# Patient Record
Sex: FEMALE | Race: WHITE | NOT HISPANIC OR LATINO | Employment: UNEMPLOYED | URBAN - METROPOLITAN AREA
[De-identification: names, ages, dates, MRNs, and addresses within clinical notes are randomized per-mention and may not be internally consistent; named-entity substitution may affect disease eponyms.]

---

## 2023-06-12 DIAGNOSIS — E78.5 HYPERLIPIDEMIA, UNSPECIFIED HYPERLIPIDEMIA TYPE: ICD-10-CM

## 2023-06-12 DIAGNOSIS — Z00.00 ANNUAL PHYSICAL EXAM: ICD-10-CM

## 2023-06-12 DIAGNOSIS — E03.9 HYPOTHYROIDISM, UNSPECIFIED TYPE: ICD-10-CM

## 2023-06-14 ENCOUNTER — LAB (OUTPATIENT)
Dept: LAB | Facility: LAB | Age: 63
End: 2023-06-14
Payer: COMMERCIAL

## 2023-06-14 DIAGNOSIS — E78.5 HYPERLIPIDEMIA, UNSPECIFIED HYPERLIPIDEMIA TYPE: ICD-10-CM

## 2023-06-14 DIAGNOSIS — Z00.00 ANNUAL PHYSICAL EXAM: ICD-10-CM

## 2023-06-14 DIAGNOSIS — E03.9 HYPOTHYROIDISM, UNSPECIFIED TYPE: ICD-10-CM

## 2023-06-14 LAB
ALANINE AMINOTRANSFERASE (SGPT) (U/L) IN SER/PLAS: 13 U/L (ref 7–45)
ALBUMIN (G/DL) IN SER/PLAS: 4.5 G/DL (ref 3.4–5)
ALKALINE PHOSPHATASE (U/L) IN SER/PLAS: 99 U/L (ref 33–136)
ANION GAP IN SER/PLAS: 11 MMOL/L (ref 10–20)
ASPARTATE AMINOTRANSFERASE (SGOT) (U/L) IN SER/PLAS: 14 U/L (ref 9–39)
BASOPHILS (10*3/UL) IN BLOOD BY AUTOMATED COUNT: 0.06 X10E9/L (ref 0–0.1)
BASOPHILS/100 LEUKOCYTES IN BLOOD BY AUTOMATED COUNT: 0.9 % (ref 0–2)
BILIRUBIN TOTAL (MG/DL) IN SER/PLAS: 0.7 MG/DL (ref 0–1.2)
CALCIUM (MG/DL) IN SER/PLAS: 9.8 MG/DL (ref 8.6–10.6)
CARBON DIOXIDE, TOTAL (MMOL/L) IN SER/PLAS: 30 MMOL/L (ref 21–32)
CHLORIDE (MMOL/L) IN SER/PLAS: 102 MMOL/L (ref 98–107)
CHOLESTEROL (MG/DL) IN SER/PLAS: 308 MG/DL (ref 0–199)
CHOLESTEROL IN HDL (MG/DL) IN SER/PLAS: 65.8 MG/DL
CHOLESTEROL/HDL RATIO: 4.7
CREATININE (MG/DL) IN SER/PLAS: 0.53 MG/DL (ref 0.5–1.05)
EOSINOPHILS (10*3/UL) IN BLOOD BY AUTOMATED COUNT: 0.12 X10E9/L (ref 0–0.7)
EOSINOPHILS/100 LEUKOCYTES IN BLOOD BY AUTOMATED COUNT: 1.8 % (ref 0–6)
ERYTHROCYTE DISTRIBUTION WIDTH (RATIO) BY AUTOMATED COUNT: 13.2 % (ref 11.5–14.5)
ERYTHROCYTE MEAN CORPUSCULAR HEMOGLOBIN CONCENTRATION (G/DL) BY AUTOMATED: 31.6 G/DL (ref 32–36)
ERYTHROCYTE MEAN CORPUSCULAR VOLUME (FL) BY AUTOMATED COUNT: 95 FL (ref 80–100)
ERYTHROCYTES (10*6/UL) IN BLOOD BY AUTOMATED COUNT: 4.56 X10E12/L (ref 4–5.2)
GFR FEMALE: >90 ML/MIN/1.73M2
GLUCOSE (MG/DL) IN SER/PLAS: 91 MG/DL (ref 74–99)
HEMATOCRIT (%) IN BLOOD BY AUTOMATED COUNT: 43.1 % (ref 36–46)
HEMOGLOBIN (G/DL) IN BLOOD: 13.6 G/DL (ref 12–16)
IMMATURE GRANULOCYTES/100 LEUKOCYTES IN BLOOD BY AUTOMATED COUNT: 0.1 % (ref 0–0.9)
LDL: 221 MG/DL (ref 0–99)
LEUKOCYTES (10*3/UL) IN BLOOD BY AUTOMATED COUNT: 6.7 X10E9/L (ref 4.4–11.3)
LYMPHOCYTES (10*3/UL) IN BLOOD BY AUTOMATED COUNT: 1.87 X10E9/L (ref 1.2–4.8)
LYMPHOCYTES/100 LEUKOCYTES IN BLOOD BY AUTOMATED COUNT: 27.7 % (ref 13–44)
MONOCYTES (10*3/UL) IN BLOOD BY AUTOMATED COUNT: 0.52 X10E9/L (ref 0.1–1)
MONOCYTES/100 LEUKOCYTES IN BLOOD BY AUTOMATED COUNT: 7.7 % (ref 2–10)
NEUTROPHILS (10*3/UL) IN BLOOD BY AUTOMATED COUNT: 4.16 X10E9/L (ref 1.2–7.7)
NEUTROPHILS/100 LEUKOCYTES IN BLOOD BY AUTOMATED COUNT: 61.8 % (ref 40–80)
NRBC (PER 100 WBCS) BY AUTOMATED COUNT: 0 /100 WBC (ref 0–0)
PLATELETS (10*3/UL) IN BLOOD AUTOMATED COUNT: 312 X10E9/L (ref 150–450)
POTASSIUM (MMOL/L) IN SER/PLAS: 4.4 MMOL/L (ref 3.5–5.3)
PROTEIN TOTAL: 7.1 G/DL (ref 6.4–8.2)
SODIUM (MMOL/L) IN SER/PLAS: 139 MMOL/L (ref 136–145)
THYROTROPIN (MIU/L) IN SER/PLAS BY DETECTION LIMIT <= 0.05 MIU/L: 2.05 MIU/L (ref 0.44–3.98)
THYROXINE (T4) FREE (NG/DL) IN SER/PLAS: 0.88 NG/DL (ref 0.78–1.48)
TRIGLYCERIDE (MG/DL) IN SER/PLAS: 107 MG/DL (ref 0–149)
UREA NITROGEN (MG/DL) IN SER/PLAS: 10 MG/DL (ref 6–23)
VLDL: 21 MG/DL (ref 0–40)

## 2023-06-14 PROCEDURE — 84443 ASSAY THYROID STIM HORMONE: CPT

## 2023-06-14 PROCEDURE — 80061 LIPID PANEL: CPT

## 2023-06-14 PROCEDURE — 85025 COMPLETE CBC W/AUTO DIFF WBC: CPT

## 2023-06-14 PROCEDURE — 36415 COLL VENOUS BLD VENIPUNCTURE: CPT

## 2023-06-14 PROCEDURE — 84439 ASSAY OF FREE THYROXINE: CPT

## 2023-06-14 PROCEDURE — 80053 COMPREHEN METABOLIC PANEL: CPT

## 2023-06-16 PROBLEM — R92.8 ABNORMAL MAMMOGRAM: Status: ACTIVE | Noted: 2023-06-16

## 2023-06-16 PROBLEM — E78.5 DYSLIPIDEMIA: Status: ACTIVE | Noted: 2023-06-16

## 2023-06-16 PROBLEM — Z85.3 HISTORY OF BREAST CANCER: Status: ACTIVE | Noted: 2020-10-07

## 2023-06-16 PROBLEM — M19.049 OSTEOARTHRITIS, HAND: Status: ACTIVE | Noted: 2023-06-16

## 2023-06-16 PROBLEM — Z86.19 HISTORY OF HERPES ZOSTER: Status: ACTIVE | Noted: 2023-06-16

## 2023-06-16 PROBLEM — E78.5 HYPERLIPIDEMIA: Status: ACTIVE | Noted: 2023-06-16

## 2023-06-16 PROBLEM — R07.9 CHEST PAIN: Status: ACTIVE | Noted: 2023-06-16

## 2023-06-16 PROBLEM — B02.9 HERPES ZOSTER: Status: ACTIVE | Noted: 2023-06-16

## 2023-06-16 PROBLEM — D21.9 FIBROIDS: Status: ACTIVE | Noted: 2023-06-16

## 2023-06-16 PROBLEM — N63.21 MASS OF UPPER OUTER QUADRANT OF LEFT BREAST: Status: ACTIVE | Noted: 2023-06-16

## 2023-06-16 PROBLEM — E03.9 HYPOTHYROIDISM: Status: ACTIVE | Noted: 2023-06-16

## 2023-06-16 PROBLEM — E78.00 HIGH CHOLESTEROL: Status: ACTIVE | Noted: 2023-06-16

## 2023-06-16 PROBLEM — E07.9 THYROID DISEASE: Status: ACTIVE | Noted: 2023-06-16

## 2023-06-16 PROBLEM — R51.9 HEADACHE: Status: ACTIVE | Noted: 2023-06-16

## 2023-06-16 PROBLEM — R12 HEARTBURN: Status: ACTIVE | Noted: 2023-06-16

## 2023-06-16 PROBLEM — M79.643 HAND PAIN: Status: ACTIVE | Noted: 2023-06-16

## 2023-06-16 PROBLEM — M19.90 ARTHRITIS: Status: ACTIVE | Noted: 2023-06-16

## 2023-06-16 PROBLEM — Z86.000 HISTORY OF DUCTAL CARCINOMA IN SITU (DCIS) OF BREAST: Status: ACTIVE | Noted: 2020-10-07

## 2023-06-16 PROBLEM — M79.605 PAIN OF LEFT LOWER EXTREMITY: Status: ACTIVE | Noted: 2023-06-16

## 2023-06-16 RX ORDER — SULFAMETHOXAZOLE AND TRIMETHOPRIM 800; 160 MG/1; MG/1
60 TABLET ORAL DAILY
COMMUNITY
End: 2023-06-19 | Stop reason: SDUPTHER

## 2023-06-16 RX ORDER — CLOBETASOL PROPIONATE 0.5 MG/G
CREAM TOPICAL 2 TIMES DAILY
COMMUNITY
Start: 2020-04-21 | End: 2023-06-19 | Stop reason: ALTCHOICE

## 2023-06-19 ENCOUNTER — OFFICE VISIT (OUTPATIENT)
Dept: PRIMARY CARE | Facility: CLINIC | Age: 63
End: 2023-06-19
Payer: COMMERCIAL

## 2023-06-19 VITALS
SYSTOLIC BLOOD PRESSURE: 128 MMHG | DIASTOLIC BLOOD PRESSURE: 68 MMHG | BODY MASS INDEX: 23.57 KG/M2 | WEIGHT: 146 LBS | TEMPERATURE: 97.7 F

## 2023-06-19 DIAGNOSIS — E78.5 DYSLIPIDEMIA: ICD-10-CM

## 2023-06-19 DIAGNOSIS — E78.5 HYPERLIPIDEMIA, UNSPECIFIED HYPERLIPIDEMIA TYPE: ICD-10-CM

## 2023-06-19 DIAGNOSIS — Z12.4 CERVICAL CANCER SCREENING: ICD-10-CM

## 2023-06-19 DIAGNOSIS — E03.9 HYPOTHYROIDISM, UNSPECIFIED TYPE: ICD-10-CM

## 2023-06-19 DIAGNOSIS — Z00.00 HEALTHCARE MAINTENANCE: Primary | ICD-10-CM

## 2023-06-19 DIAGNOSIS — Z12.4 ENCOUNTER FOR PAPANICOLAOU SMEAR FOR CERVICAL CANCER SCREENING: ICD-10-CM

## 2023-06-19 PROCEDURE — 99396 PREV VISIT EST AGE 40-64: CPT | Performed by: FAMILY MEDICINE

## 2023-06-19 PROCEDURE — 1036F TOBACCO NON-USER: CPT | Performed by: FAMILY MEDICINE

## 2023-06-19 PROCEDURE — 88175 CYTOPATH C/V AUTO FLUID REDO: CPT

## 2023-06-19 RX ORDER — THYROID 60 MG/1
60 TABLET ORAL DAILY
Qty: 90 TABLET | Refills: 3 | Status: SHIPPED | OUTPATIENT
Start: 2023-06-19 | End: 2024-06-10 | Stop reason: SDUPTHER

## 2023-06-19 NOTE — PROGRESS NOTES
"Subjective   Patient ID: Alicia Pompa is a 63 y.o. female who presents for No chief complaint on file..  HPI  Feels well, no specific complaints or concerns today.    Colonoscopy 2018- told \"      good for 10 years\"    FH: 4 siblings all have HL, only brother had MI    2 years ago had left breast cancer- follows up with Dr. Munoz in August     CT coronary calcium score of 4 (2021)   Review of Systems  General: no fever or night sweats, no change in weight  Eyes: no vision disturbance  ENT: no hearing loss, no hoarseness, no mouth lesions, no sore throat, and no dysphagia  CV: no chest pain, no palpitations, no lower extremity edema  Resp: no shortness of breath, no cough  GI: no abdominal pain, no change in bowel habits  : no urinary problems  MSK: no arthralgias, myalgias, or back pain  Skin; no rashes or new/changed skin lesions- saw derm for screening   Neuro: no headache, no difficulty walking      Objective   Visit Vitals  /68   Temp 36.5 °C (97.7 °F) (Oral)      Physical Exam  Appears well.     HEENT: OP clear. Sclera white. PERRL. EACs and TMs clear.     Neck: supple, no masses, or lymphadenopathy. No thyromegaly.     CVS: RRR. No murmurs. No peripheral edema.    Lungs: clear with normal inspirations and expirations.    Breasts: Symmetrical, no masses, no skin retraction or dimpling. No nipple discharge. No axillary nodes.    Abd: soft, NT, no masses or HSM.    Pelvic: No vulvar lesions. No vaginal lesions or discharge. Normal appearing cervix with no lesions. No adnexal masses. Normal uterus.    Rectal: No masses. Guaiac negative stool.    Skin: No suspicious lesions.     Assessment/Plan   Diagnoses and all orders for this visit:  Healthcare maintenance  Hypothyroidism, unspecified type  -     Lipid panel; Future  -     T4, free; Future  -     TSH; Future  -     Comprehensive metabolic panel; Future  -     CBC and Auto Differential; Future  -     thyroid, pork, ([place] Thyroid) 60 mg tablet; Take 1 " "tablet (60 mg) by mouth once daily.  Dyslipidemia  -     Lipid panel; Future  -     T4, free; Future  -     TSH; Future  -     Comprehensive metabolic panel; Future  -     CBC and Auto Differential; Future  Hyperlipidemia, unspecified hyperlipidemia type  -     Lipid panel; Future  -     T4, free; Future  -     TSH; Future  -     Comprehensive metabolic panel; Future  -     CBC and Auto Differential; Future    Referral to vascular doctor for evaluation of varicose veins of legs     She chooses not to start chol-lowering medication- understands her CV risk with very elevated cholesterol and LDL \"but as long as my coronary calcium score is low I'm not going to worry about it\"     Frieda Solorio MD  Family Medicine   [facility]  "

## 2023-06-28 LAB
COMPLETE PATHOLOGY REPORT: NORMAL
CONVERTED CLINICAL DIAGNOSIS-HISTORY: NORMAL
CONVERTED DIAGNOSIS COMMENT: NORMAL
CONVERTED FINAL DIAGNOSIS: NORMAL
CONVERTED FINAL REPORT PDF LINK TO COPY AND PASTE: NORMAL
CONVERTED PHYSICIAN NOTIFICATION: NORMAL

## 2024-05-31 ENCOUNTER — LAB (OUTPATIENT)
Dept: LAB | Facility: LAB | Age: 64
End: 2024-05-31
Payer: COMMERCIAL

## 2024-05-31 DIAGNOSIS — E78.5 DYSLIPIDEMIA: ICD-10-CM

## 2024-05-31 DIAGNOSIS — E03.9 HYPOTHYROIDISM, UNSPECIFIED TYPE: ICD-10-CM

## 2024-05-31 DIAGNOSIS — E78.5 HYPERLIPIDEMIA, UNSPECIFIED HYPERLIPIDEMIA TYPE: ICD-10-CM

## 2024-05-31 LAB
ALBUMIN SERPL BCP-MCNC: 4.7 G/DL (ref 3.4–5)
ALP SERPL-CCNC: 93 U/L (ref 33–136)
ALT SERPL W P-5'-P-CCNC: 15 U/L (ref 7–45)
ANION GAP SERPL CALC-SCNC: 12 MMOL/L (ref 10–20)
AST SERPL W P-5'-P-CCNC: 14 U/L (ref 9–39)
BASOPHILS # BLD AUTO: 0.04 X10*3/UL (ref 0–0.1)
BASOPHILS NFR BLD AUTO: 0.5 %
BILIRUB SERPL-MCNC: 0.4 MG/DL (ref 0–1.2)
BUN SERPL-MCNC: 12 MG/DL (ref 6–23)
CALCIUM SERPL-MCNC: 9.9 MG/DL (ref 8.6–10.6)
CHLORIDE SERPL-SCNC: 102 MMOL/L (ref 98–107)
CHOLEST SERPL-MCNC: 319 MG/DL (ref 0–199)
CHOLESTEROL/HDL RATIO: 4.6
CO2 SERPL-SCNC: 28 MMOL/L (ref 21–32)
CREAT SERPL-MCNC: 0.55 MG/DL (ref 0.5–1.05)
EGFRCR SERPLBLD CKD-EPI 2021: >90 ML/MIN/1.73M*2
EOSINOPHIL # BLD AUTO: 0.12 X10*3/UL (ref 0–0.7)
EOSINOPHIL NFR BLD AUTO: 1.6 %
ERYTHROCYTE [DISTWIDTH] IN BLOOD BY AUTOMATED COUNT: 13.1 % (ref 11.5–14.5)
GLUCOSE SERPL-MCNC: 95 MG/DL (ref 74–99)
HCT VFR BLD AUTO: 43.3 % (ref 36–46)
HDLC SERPL-MCNC: 68.9 MG/DL
HGB BLD-MCNC: 14 G/DL (ref 12–16)
IMM GRANULOCYTES # BLD AUTO: 0.03 X10*3/UL (ref 0–0.7)
IMM GRANULOCYTES NFR BLD AUTO: 0.4 % (ref 0–0.9)
LDLC SERPL CALC-MCNC: 227 MG/DL
LYMPHOCYTES # BLD AUTO: 1.95 X10*3/UL (ref 1.2–4.8)
LYMPHOCYTES NFR BLD AUTO: 25.9 %
MCH RBC QN AUTO: 30.8 PG (ref 26–34)
MCHC RBC AUTO-ENTMCNC: 32.3 G/DL (ref 32–36)
MCV RBC AUTO: 95 FL (ref 80–100)
MONOCYTES # BLD AUTO: 0.54 X10*3/UL (ref 0.1–1)
MONOCYTES NFR BLD AUTO: 7.2 %
NEUTROPHILS # BLD AUTO: 4.84 X10*3/UL (ref 1.2–7.7)
NEUTROPHILS NFR BLD AUTO: 64.4 %
NON HDL CHOLESTEROL: 250 MG/DL (ref 0–149)
NRBC BLD-RTO: 0 /100 WBCS (ref 0–0)
PLATELET # BLD AUTO: 324 X10*3/UL (ref 150–450)
POTASSIUM SERPL-SCNC: 5 MMOL/L (ref 3.5–5.3)
PROT SERPL-MCNC: 7.3 G/DL (ref 6.4–8.2)
RBC # BLD AUTO: 4.55 X10*6/UL (ref 4–5.2)
SODIUM SERPL-SCNC: 137 MMOL/L (ref 136–145)
T4 FREE SERPL-MCNC: 1.01 NG/DL (ref 0.78–1.48)
TRIGL SERPL-MCNC: 118 MG/DL (ref 0–149)
TSH SERPL-ACNC: 2.71 MIU/L (ref 0.44–3.98)
VLDL: 24 MG/DL (ref 0–40)
WBC # BLD AUTO: 7.5 X10*3/UL (ref 4.4–11.3)

## 2024-05-31 PROCEDURE — 80061 LIPID PANEL: CPT

## 2024-05-31 PROCEDURE — 84439 ASSAY OF FREE THYROXINE: CPT

## 2024-05-31 PROCEDURE — 85025 COMPLETE CBC W/AUTO DIFF WBC: CPT

## 2024-05-31 PROCEDURE — 84443 ASSAY THYROID STIM HORMONE: CPT

## 2024-05-31 PROCEDURE — 80053 COMPREHEN METABOLIC PANEL: CPT

## 2024-06-10 ENCOUNTER — OFFICE VISIT (OUTPATIENT)
Dept: PRIMARY CARE | Facility: CLINIC | Age: 64
End: 2024-06-10
Payer: COMMERCIAL

## 2024-06-10 VITALS
DIASTOLIC BLOOD PRESSURE: 64 MMHG | HEIGHT: 65 IN | BODY MASS INDEX: 25.99 KG/M2 | TEMPERATURE: 98.7 F | HEART RATE: 87 BPM | WEIGHT: 156 LBS | SYSTOLIC BLOOD PRESSURE: 124 MMHG | OXYGEN SATURATION: 97 %

## 2024-06-10 DIAGNOSIS — Z12.4 ENCOUNTER FOR PAPANICOLAOU SMEAR FOR CERVICAL CANCER SCREENING: ICD-10-CM

## 2024-06-10 DIAGNOSIS — E03.9 HYPOTHYROIDISM, UNSPECIFIED TYPE: ICD-10-CM

## 2024-06-10 DIAGNOSIS — N95.2 VAGINITIS, ATROPHIC: ICD-10-CM

## 2024-06-10 DIAGNOSIS — Z12.31 BREAST CANCER SCREENING BY MAMMOGRAM: ICD-10-CM

## 2024-06-10 DIAGNOSIS — E78.2 MIXED HYPERLIPIDEMIA: ICD-10-CM

## 2024-06-10 DIAGNOSIS — Z12.4 CERVICAL CANCER SCREENING: ICD-10-CM

## 2024-06-10 DIAGNOSIS — Z00.00 HEALTH MAINTENANCE EXAMINATION: Primary | ICD-10-CM

## 2024-06-10 PROCEDURE — 88175 CYTOPATH C/V AUTO FLUID REDO: CPT

## 2024-06-10 PROCEDURE — 99396 PREV VISIT EST AGE 40-64: CPT | Performed by: FAMILY MEDICINE

## 2024-06-10 PROCEDURE — 1036F TOBACCO NON-USER: CPT | Performed by: FAMILY MEDICINE

## 2024-06-10 RX ORDER — ESTRADIOL 0.1 MG/G
CREAM VAGINAL
Qty: 42.5 G | Refills: 3 | Status: SHIPPED | OUTPATIENT
Start: 2024-06-10

## 2024-06-10 RX ORDER — THYROID 60 MG/1
60 TABLET ORAL DAILY
Qty: 90 TABLET | Refills: 3 | Status: SHIPPED | OUTPATIENT
Start: 2024-06-10

## 2024-06-10 NOTE — PROGRESS NOTES
"Subjective   Patient ID: Alicia Pompa is a 64 y.o. female who presents for Well Woman Exam (EP. Here for WWE/PAP.).  HPI  Feels well, no specific complaints or concerns today.    Has some vein issues of legs that she would like to see specialist about     Does mention inability to have sex due to pain and just a \"discomfort down there\" but has been hesitant to use vaginal estrogen due to h/o breast cancer    H/o very elevated cholesterol   Last coronary calcium score was in 2021 and was 4.5  Had taken red yeast rice for 2 mos (Jan and Feb)    Had breast cancer 3 years ago and was released from care of breast surgeon; mammogram due in August 2024    Colonoscopy 2018 and was told next one in 10 years   Review of Systems  General: no fever or night sweats, no change in weight  Eyes: no vision disturbance  ENT: no hearing loss, no hoarseness, no mouth lesions, no sore throat, and no dysphagia  CV: no chest pain, no palpitations, no lower extremity edema  Resp: no shortness of breath, no cough  GI: no abdominal pain, no change in bowel habits  : no urinary problems  MSK: no arthralgias, myalgias, or back pain  Skin; no rashes or new/changed skin lesions  Neuro: no headaches     Objective   /64   Pulse 87   Temp 37.1 °C (98.7 °F) (Oral)   Ht 1.651 m (5' 5\")   Wt 70.8 kg (156 lb)   SpO2 97%   BMI 25.96 kg/m²    Physical Exam  Appears well.     HEENT: OP clear. Sclera white. PERRL. EACs and TMs clear.     Neck: supple, no masses, or lymphadenopathy. No thyromegaly.     CVS: RRR. No murmurs. No peripheral edema.    Lungs: clear with normal inspirations and expirations.    Breasts: Symmetrical, no masses, no skin retraction or dimpling. No nipple discharge. No axillary nodes.    Abd: soft, NT, no masses or HSM.    Pelvic: No vulvar lesions. No vaginal lesions or discharge. Normal appearing cervix with no lesions. No adnexal masses. Normal uterus.    Rectal: No masses. Guaiac negative stool.    Skin: No " suspicious lesions.      Reviewed labs:      Assessment/Plan   Diagnoses and all orders for this visit:  Health maintenance examination  Breast cancer screening by mammogram  -     BI mammo bilateral screening tomosynthesis; Future  Encounter for Papanicolaou smear for cervical cancer screening  -     THINPREP PAP TEST  Cervical cancer screening  -     THINPREP PAP TEST  Hypothyroidism, unspecified type  -     thyroid, pork, ([place] Thyroid) 60 mg tablet; Take 1 tablet (60 mg) by mouth once daily.  Mixed hyperlipidemia  -     CT cardiac scoring wo IV contrast; Future  Vaginitis, atrophic  -     estradiol (Estrace) 0.01 % (0.1 mg/gram) vaginal cream; Apply to vaginal area/perineum nightly for 2 weeks then 3x/week    Pt defers statin medication, wants to get coronary calcium score test and then will consider resuming red yeast rice    Trial of estradiol vaginal cream to apply at vaginal opening and perineum area     Frieda Solorio MD  Family Medicine   [facility]

## 2024-06-20 LAB
CYTOLOGY CMNT CVX/VAG CYTO-IMP: NORMAL
LAB AP HPV GENOTYPE QUESTION: YES
LAB AP HPV HR: NORMAL
LABORATORY COMMENT REPORT: NORMAL
PATH REPORT.TOTAL CANCER: NORMAL

## 2024-08-20 ENCOUNTER — HOSPITAL ENCOUNTER (OUTPATIENT)
Dept: RADIOLOGY | Facility: CLINIC | Age: 64
Discharge: HOME | End: 2024-08-20
Payer: COMMERCIAL

## 2024-08-20 VITALS — WEIGHT: 153 LBS | HEIGHT: 65 IN | BODY MASS INDEX: 25.49 KG/M2

## 2024-08-20 DIAGNOSIS — Z12.31 BREAST CANCER SCREENING BY MAMMOGRAM: ICD-10-CM

## 2024-08-20 PROCEDURE — 77067 SCR MAMMO BI INCL CAD: CPT

## 2024-08-20 PROCEDURE — 77067 SCR MAMMO BI INCL CAD: CPT | Performed by: RADIOLOGY

## 2024-08-20 PROCEDURE — 77063 BREAST TOMOSYNTHESIS BI: CPT | Performed by: RADIOLOGY

## 2024-09-27 ENCOUNTER — HOSPITAL ENCOUNTER (OUTPATIENT)
Dept: RADIOLOGY | Facility: CLINIC | Age: 64
Discharge: HOME | End: 2024-09-27
Payer: COMMERCIAL

## 2024-09-27 DIAGNOSIS — E78.2 MIXED HYPERLIPIDEMIA: ICD-10-CM

## 2024-09-27 PROCEDURE — 75571 CT HRT W/O DYE W/CA TEST: CPT

## 2025-03-11 ENCOUNTER — TELEPHONE (OUTPATIENT)
Dept: PRIMARY CARE | Facility: CLINIC | Age: 65
End: 2025-03-11
Payer: COMMERCIAL

## 2025-03-11 DIAGNOSIS — E78.5 HYPERLIPIDEMIA, UNSPECIFIED HYPERLIPIDEMIA TYPE: Primary | ICD-10-CM

## 2025-03-11 DIAGNOSIS — K21.9 GASTROESOPHAGEAL REFLUX DISEASE WITHOUT ESOPHAGITIS: ICD-10-CM

## 2025-03-11 NOTE — TELEPHONE ENCOUNTER
Alicia called and said that she was in the hospital in Dec 24 and right after she got out, they left for [place]. She was given:    Atorvastatin 40 mg - 10 days left  Pantoprazole 40 mg - 10 days left    [facility]    She needs to have refills sent to her local [facility] so the pharmacy in [place] can fill them for her.     She is coming back in April and is scheduled for a med follow up.     Last appt: 06/10/24  Next appt: 04/22/25  Labs: 05/31/24

## 2025-03-12 RX ORDER — ATORVASTATIN CALCIUM 40 MG/1
40 TABLET, FILM COATED ORAL DAILY
Qty: 90 TABLET | Refills: 0 | Status: SHIPPED | OUTPATIENT
Start: 2025-03-12 | End: 2026-04-16

## 2025-03-12 RX ORDER — PANTOPRAZOLE SODIUM 40 MG/1
40 TABLET, DELAYED RELEASE ORAL DAILY
Qty: 90 TABLET | Refills: 0 | Status: SHIPPED | OUTPATIENT
Start: 2025-03-12 | End: 2025-05-11

## 2025-04-11 DIAGNOSIS — E78.5 HYPERLIPIDEMIA, UNSPECIFIED HYPERLIPIDEMIA TYPE: ICD-10-CM

## 2025-04-11 DIAGNOSIS — E03.9 HYPOTHYROIDISM, UNSPECIFIED TYPE: ICD-10-CM

## 2025-04-22 ENCOUNTER — APPOINTMENT (OUTPATIENT)
Dept: PRIMARY CARE | Facility: CLINIC | Age: 65
End: 2025-04-22
Payer: COMMERCIAL

## 2025-04-22 VITALS
SYSTOLIC BLOOD PRESSURE: 126 MMHG | TEMPERATURE: 98.2 F | DIASTOLIC BLOOD PRESSURE: 76 MMHG | OXYGEN SATURATION: 98 % | RESPIRATION RATE: 18 BRPM | HEART RATE: 83 BPM | WEIGHT: 162.3 LBS | BODY MASS INDEX: 27.04 KG/M2 | HEIGHT: 65 IN

## 2025-04-22 DIAGNOSIS — R60.0 PERIPHERAL EDEMA: Primary | ICD-10-CM

## 2025-04-22 DIAGNOSIS — E78.2 MIXED HYPERLIPIDEMIA: ICD-10-CM

## 2025-04-22 LAB
ALBUMIN SERPL-MCNC: 4.4 G/DL (ref 3.6–5.1)
ALP SERPL-CCNC: 121 U/L (ref 37–153)
ALT SERPL-CCNC: 20 U/L (ref 6–29)
ANION GAP SERPL CALCULATED.4IONS-SCNC: 11 MMOL/L (CALC) (ref 7–17)
AST SERPL-CCNC: 20 U/L (ref 10–35)
BILIRUB SERPL-MCNC: 0.8 MG/DL (ref 0.2–1.2)
BUN SERPL-MCNC: 9 MG/DL (ref 7–25)
CALCIUM SERPL-MCNC: 9.3 MG/DL (ref 8.6–10.4)
CHLORIDE SERPL-SCNC: 103 MMOL/L (ref 98–110)
CHOLEST SERPL-MCNC: 233 MG/DL
CHOLEST/HDLC SERPL: 3.3 (CALC)
CO2 SERPL-SCNC: 26 MMOL/L (ref 20–32)
CREAT SERPL-MCNC: 0.56 MG/DL (ref 0.5–1.05)
EGFRCR SERPLBLD CKD-EPI 2021: 103 ML/MIN/1.73M2
GLUCOSE SERPL-MCNC: 105 MG/DL (ref 65–99)
HDLC SERPL-MCNC: 70 MG/DL
LDLC SERPL CALC-MCNC: 141 MG/DL (CALC)
NONHDLC SERPL-MCNC: 163 MG/DL (CALC)
POTASSIUM SERPL-SCNC: 4.4 MMOL/L (ref 3.5–5.3)
PROT SERPL-MCNC: 6.7 G/DL (ref 6.1–8.1)
SODIUM SERPL-SCNC: 140 MMOL/L (ref 135–146)
T4 FREE SERPL-MCNC: 1 NG/DL (ref 0.8–1.8)
TRIGL SERPL-MCNC: 104 MG/DL
TSH SERPL-ACNC: 2.81 MIU/L (ref 0.4–4.5)

## 2025-04-22 PROCEDURE — 99213 OFFICE O/P EST LOW 20 MIN: CPT | Performed by: FAMILY MEDICINE

## 2025-04-22 PROCEDURE — 3008F BODY MASS INDEX DOCD: CPT | Performed by: FAMILY MEDICINE

## 2025-04-22 PROCEDURE — 1036F TOBACCO NON-USER: CPT | Performed by: FAMILY MEDICINE

## 2025-04-22 ASSESSMENT — PATIENT HEALTH QUESTIONNAIRE - PHQ9
SUM OF ALL RESPONSES TO PHQ9 QUESTIONS 1 AND 2: 0
2. FEELING DOWN, DEPRESSED OR HOPELESS: NOT AT ALL
1. LITTLE INTEREST OR PLEASURE IN DOING THINGS: NOT AT ALL

## 2025-04-22 ASSESSMENT — ENCOUNTER SYMPTOMS
LOSS OF SENSATION IN FEET: 0
DEPRESSION: 0
OCCASIONAL FEELINGS OF UNSTEADINESS: 0

## 2025-04-22 NOTE — PROGRESS NOTES
Subjective   Patient ID: Alicia Pompa is a 64 y.o. female who presents for Follow-up (EPV, F/U Medications, F/U Hospital stay 12/29 & 12/30/2024/Blood work completed 4/21/25/C/O feet puffy x 1 week).    History of Present Illness  Alicia Pompa is a 64 year old female who presents for med refill (atorvastatin)    She experienced severe chest pain radiating to the shoulder and arm, which led to a hospital visit after Jean. Cardiac enzymes were negative, and a CT scan of the chest was normal. Then she followed up with GI and had an EGD which revealed ulcers and a hiatal hernia, for which she was prescribed Protonix. She has since discontinued Protonix due to lack of heartburn symptoms and concerns about prolonged use.    She has a history of high cholesterol and initially took red yeast rice, which was ineffective. She agreed to start atorvastatin after her hospital visit in December. She also takes a baby aspirin daily.    She stopped intermittent fasting in January to accommodate morning medications, which she had been practicing for five years. Since stopping, her arthritis pain has returned, and she reports significant joint pain. She is considering resuming intermittent fasting as she is no longer taking Protonix.    She underwent multiple radiofrequency ablations for faulty leg valves starting in June, resulting in severe bruising and nerve damage. She reports increased leg pain, discoloration, and persistent nerve pain. She is hesitant to undergo further procedures. She has a history of varicose veins and notes that swelling was present in the summer before the procedures. Swelling in her feet has persisted for a week, and is worse in the right foot.      Review of Systems  Genl: The patient has been in good health without recent weight change, fevers, or night sweats  CVS: No chest pain, irregular heartbeat, shortness of breath, or swollen ankles  Resp: No cough or difficulty breathing  GI: No change in  "bowel habits or abdominal pain. No heartburn  : No urinary problems.   Objective     /76 (BP Location: Right arm, Patient Position: Sitting)   Pulse 83   Temp 36.8 °C (98.2 °F) (Oral)   Resp 18   Ht 1.651 m (5' 5\")   Wt 73.6 kg (162 lb 4.8 oz)   SpO2 98%   BMI 27.01 kg/m²      Assessment/Plan     Diagnoses and all orders for this visit:  Peripheral edema  -     Referral to Vascular Medicine; Future  Mixed hyperlipidemia    Assessment & Plan  Varicose veins with complications  Varicose veins with previous RFA causing nerve damage and persistent pain. Worsening discoloration and pain noted. She is hesitant about further procedures and seeks another opinion.  - Refer to a vascular specialist for a second opinion.    Swelling of lower extremities  Persistent swelling in feet, likely related to varicose veins, without systemic symptoms.  - Encourage exercise and use of compression stockings.    Hyperlipidemia  Hyperlipidemia managed with atorvastatin, resulting in significant cholesterol reduction. CoQ10 supplementation ongoing. Family history of cardiovascular issues noted.  - Continue atorvastatin therapy.  - Continue CoQ10 supplementation.  - Continue daily baby aspirin.    Frieda Solorio MD    This medical note was created with the assistance of artificial intelligence (AI) for documentation purposes. The content has been reviewed and confirmed by the healthcare provider for accuracy and completeness. Patient consented to the use of audio recording and use of AI during their visit.    "

## 2025-05-27 DIAGNOSIS — Z12.31 BREAST CANCER SCREENING BY MAMMOGRAM: ICD-10-CM

## 2025-06-04 DIAGNOSIS — E03.9 HYPOTHYROIDISM, UNSPECIFIED TYPE: ICD-10-CM

## 2025-06-04 NOTE — TELEPHONE ENCOUNTER
Alicia called for a refill: She will be out of only this medication before her upcoming appt on 06/13/25.     Alpha Thyroid 60 mg  only a couple left    [facility]    Last appt: 04/22/25  Next appt: 06/13/25  Labs: 04/21/25

## 2025-06-06 RX ORDER — THYROID 60 MG/1
60 TABLET ORAL DAILY
Qty: 30 TABLET | Refills: 0 | Status: SHIPPED | OUTPATIENT
Start: 2025-06-06

## 2025-06-13 ENCOUNTER — APPOINTMENT (OUTPATIENT)
Dept: PRIMARY CARE | Facility: CLINIC | Age: 65
End: 2025-06-13
Payer: COMMERCIAL

## 2025-06-13 VITALS
WEIGHT: 160.1 LBS | HEART RATE: 85 BPM | TEMPERATURE: 98.4 F | HEIGHT: 65 IN | SYSTOLIC BLOOD PRESSURE: 138 MMHG | DIASTOLIC BLOOD PRESSURE: 70 MMHG | RESPIRATION RATE: 18 BRPM | BODY MASS INDEX: 26.67 KG/M2 | OXYGEN SATURATION: 97 %

## 2025-06-13 DIAGNOSIS — E03.9 HYPOTHYROIDISM, UNSPECIFIED TYPE: ICD-10-CM

## 2025-06-13 DIAGNOSIS — E78.5 HYPERLIPIDEMIA, UNSPECIFIED HYPERLIPIDEMIA TYPE: ICD-10-CM

## 2025-06-13 DIAGNOSIS — N95.2 ATROPHIC VAGINITIS: ICD-10-CM

## 2025-06-13 DIAGNOSIS — Z00.00 HEALTH MAINTENANCE EXAMINATION: Primary | ICD-10-CM

## 2025-06-13 PROBLEM — E78.01 FAMILIAL HYPERCHOLESTEREMIA: Status: ACTIVE | Noted: 2024-12-29

## 2025-06-13 PROBLEM — C50.419 MALIGNANT NEOPLASM OF UPPER-OUTER QUADRANT OF FEMALE BREAST: Status: ACTIVE | Noted: 2023-08-17

## 2025-06-13 PROBLEM — L30.9 DERMATITIS: Status: ACTIVE | Noted: 2025-06-13

## 2025-06-13 PROBLEM — Z98.890 S/P ENDOSCOPY: Status: ACTIVE | Noted: 2024-12-30

## 2025-06-13 PROBLEM — R10.9 ACUTE ABDOMINAL PAIN: Status: ACTIVE | Noted: 2024-12-29

## 2025-06-13 PROBLEM — B02.9 SHINGLES: Status: ACTIVE | Noted: 2025-06-13

## 2025-06-13 PROBLEM — M79.643 PAIN OF HAND: Status: ACTIVE | Noted: 2025-06-13

## 2025-06-13 PROBLEM — K25.9 MULTIPLE GASTRIC ULCERS: Status: ACTIVE | Noted: 2024-12-30

## 2025-06-13 PROBLEM — K44.9 HIATAL HERNIA: Status: ACTIVE | Noted: 2024-12-30

## 2025-06-13 PROBLEM — E07.9 DISORDER OF THYROID GLAND: Status: ACTIVE | Noted: 2023-06-16

## 2025-06-13 PROBLEM — K29.80 DUODENITIS: Status: ACTIVE | Noted: 2024-12-30

## 2025-06-13 PROBLEM — R07.9 ACUTE CHEST PAIN: Status: ACTIVE | Noted: 2024-12-29

## 2025-06-13 PROBLEM — R74.01 HIGH ALANINE AMINOTRANSFERASE (ALT) LEVEL: Status: ACTIVE | Noted: 2025-06-13

## 2025-06-13 PROBLEM — J02.9 PHARYNGITIS: Status: ACTIVE | Noted: 2025-06-13

## 2025-06-13 PROBLEM — E78.00 HYPERCHOLESTEROLEMIA: Status: ACTIVE | Noted: 2023-06-16

## 2025-06-13 PROBLEM — M19.049 OSTEOARTHRITIS OF HAND: Status: ACTIVE | Noted: 2023-06-16

## 2025-06-13 PROBLEM — Z85.3 HISTORY OF MALIGNANT NEOPLASM OF BREAST: Status: ACTIVE | Noted: 2020-10-07

## 2025-06-13 PROBLEM — J32.9 SINUSITIS: Status: ACTIVE | Noted: 2025-06-13

## 2025-06-13 PROBLEM — L98.9 INFLAMMATORY DERMATOSIS: Status: ACTIVE | Noted: 2025-06-13

## 2025-06-13 PROBLEM — I24.9 ACUTE CORONARY SYNDROME (MULTI): Status: ACTIVE | Noted: 2024-12-29

## 2025-06-13 PROBLEM — K21.9 GERD (GASTROESOPHAGEAL REFLUX DISEASE): Status: ACTIVE | Noted: 2024-12-29

## 2025-06-13 PROBLEM — N63.21 LUMP IN UPPER OUTER QUADRANT OF LEFT BREAST: Status: ACTIVE | Noted: 2023-06-16

## 2025-06-13 PROBLEM — J01.90 ACUTE SINUSITIS: Status: ACTIVE | Noted: 2025-06-13

## 2025-06-13 PROCEDURE — 3008F BODY MASS INDEX DOCD: CPT | Performed by: FAMILY MEDICINE

## 2025-06-13 PROCEDURE — 99396 PREV VISIT EST AGE 40-64: CPT | Performed by: FAMILY MEDICINE

## 2025-06-13 RX ORDER — THYROID 60 MG/1
60 TABLET ORAL DAILY
Qty: 90 TABLET | Refills: 3 | Status: SHIPPED | OUTPATIENT
Start: 2025-06-13

## 2025-06-13 RX ORDER — UBIDECARENONE 30 MG
100 CAPSULE ORAL DAILY
COMMUNITY

## 2025-06-13 RX ORDER — ATORVASTATIN CALCIUM 40 MG/1
40 TABLET, FILM COATED ORAL DAILY
Qty: 90 TABLET | Refills: 3 | Status: SHIPPED | OUTPATIENT
Start: 2025-06-13 | End: 2026-07-18

## 2025-06-13 RX ORDER — ASPIRIN 81 MG/1
1 TABLET ORAL DAILY
COMMUNITY
Start: 2025-01-01

## 2025-06-13 RX ORDER — ESTRADIOL 0.1 MG/G
2 CREAM VAGINAL DAILY
Qty: 42.5 G | Refills: 3 | Status: SHIPPED | OUTPATIENT
Start: 2025-06-13 | End: 2026-06-13

## 2025-06-13 ASSESSMENT — PROMIS GLOBAL HEALTH SCALE
RATE_QUALITY_OF_LIFE: VERY GOOD
RATE_SOCIAL_SATISFACTION: VERY GOOD
RATE_AVERAGE_FATIGUE: MODERATE
CARRYOUT_SOCIAL_ACTIVITIES: VERY GOOD
CARRYOUT_PHYSICAL_ACTIVITIES: COMPLETELY
RATE_AVERAGE_PAIN: 4
EMOTIONAL_PROBLEMS: RARELY
RATE_MENTAL_HEALTH: VERY GOOD
RATE_PHYSICAL_HEALTH: GOOD
RATE_GENERAL_HEALTH: GOOD

## 2025-06-13 NOTE — PROGRESS NOTES
Subjective   Patient ID: Alicia Pompa is a 65 y.o. female who presents for Annual Exam (EPV, WWE with Pap/Blood work completed 4/21/25/C/O Left arm loss of full motion).    History of Present Illness  Alicia Pompa is a 65 year old female who presents for WWE    She has experienced a loss of full range of motion in her left arm for a few weeks. The pain is significant when attempting to move the arm, especially when lifting it over her head.     She has had two urinary tract infections (UTIs) in recent months. The first occurred in March in [place], described as 'peeing glass shards.' She used a telehealth service and was prescribed an antibiotic. In May, she experienced similar symptoms and received a prescription for Macrobid, which she took for five days. She is concerned about the recurrence of UTIs, especially with an upcoming two-week cruise. UTIs have been rare for her, with the last one occurring over 20 years ago. She has been using over-the-counter medication to manage symptoms temporarily.    She reports issues with vaginal atrophy, describing the perineal area as 'real thin' and prone to tearing, causing significant pain during urination. She has previously used estradiol tablets but has not been using them recently as they didn't seem to help all that much. The tissue is very sensitive and fragile, with occasional bleeding. She has tried various topical treatments for relief, including Vaseline and Desitin.  Sexual intercourse is painful.     She is currently taking atorvastatin since January 1st and [place] thyroid medication.     Review of Systems  General: no fever or night sweats, no change in weight  Eyes: no vision disturbance  ENT: no hearing loss, no hoarseness, no mouth lesions, no sore throat, and no dysphagia  CV: no chest pain, no palpitations, no lower extremity edema  Resp: no shortness of breath, no cough  GI: no abdominal pain, no change in bowel habits  : see HPI   MSK: just the  "shoulder pain (see HPI)  Skin; no rashes or new/changed skin lesions; sees dermatologist for skin screening   Neuro: no headache, no difficulty walking     Objective     /70 (BP Location: Left arm, Patient Position: Sitting)   Pulse 85   Temp 36.9 °C (98.4 °F) (Oral)   Resp 18   Ht 1.65 m (5' 4.96\")   Wt 72.6 kg (160 lb 1.6 oz)   SpO2 97%   BMI 26.67 kg/m²      Physical Exam  Appears well.     HEENT: OP clear. Sclera white. PERRL. EACs and TMs clear.     Neck: supple, no masses, or lymphadenopathy. No thyromegaly.     CVS: RRR. No murmurs. No peripheral edema.    Lungs: clear with normal inspirations and expirations.    Breasts: Symmetrical, no masses, no skin retraction or dimpling. No nipple discharge. No axillary nodes.    Abd: soft, NT, no masses or HSM.    Pelvic: No vulvar lesions; + vaginal atrophy     Skin: No suspicious lesions.      Assessment/Plan     Diagnoses and all orders for this visit:  Health maintenance examination  -     CBC and Auto Differential; Future  Hypothyroidism, unspecified type  -     thyroid, pork, ([place] Thyroid) 60 mg tablet; Take 1 tablet (60 mg) by mouth once daily.  -     CBC and Auto Differential; Future  Hyperlipidemia, unspecified hyperlipidemia type  -     atorvastatin (Lipitor) 40 mg tablet; Take 1 tablet (40 mg) by mouth once daily.  -     CBC and Auto Differential; Future  -     Comprehensive Metabolic Panel; Future  -     Lipid Panel; Future  Atrophic vaginitis  -     estradiol (Estrace) 0.01 % (0.1 mg/gram) vaginal cream; Insert 0.5 Applicatorfuls (2 g) into the vagina once daily. Apply to vagina nightly for 1 week then every Monday/Wednesday/Friday.  Cervical cancer screening    Assessment & Plan  Left Shoulder Pain  Loss of full range of motion likely due to rotator cuff issues. Risk of adhesive capsulitis due to limited movement. Cortisone injection may aid pain relief and mobility before physical therapy.  - Refer to orthopedic specialist for evaluation. " (Dr. Glez phone number given)    Vaginal Atrophy  Symptoms of vaginal atrophy with fragile tissue and dysuria. Inconsistent estradiol cream use. Increased UTI risk due to atrophy. Consistent estradiol cream use recommended to improve symptoms and reduce UTI risk.  - Recommend using estradiol cream both internally and externally.    Recurrent Urinary Tract Infections (UTIs)  Two UTIs recently with frequency and dysuria. Concerns about recurrence during travel.   - Advise to seek medical attention on the cruise if UTI symptoms recur.  - Offer nurse visit for urine test if symptoms arise before travel.    General Health Maintenance  Discussion of recent blood work and screenings. Last colonoscopy in 2018 with 10-year follow-up. Consider Cologuard as interim screening. Mammogram scheduled for August. Order lipid panel and chemistry panel.      Frieda Solorio MD    This medical note was created with the assistance of artificial intelligence (AI) for documentation purposes. The content has been reviewed and confirmed by the healthcare provider for accuracy and completeness. Patient consented to the use of audio recording and use of AI during their visit.

## 2025-08-05 ENCOUNTER — OFFICE VISIT (OUTPATIENT)
Dept: CARDIOLOGY | Facility: CLINIC | Age: 65
End: 2025-08-05
Payer: COMMERCIAL

## 2025-08-05 VITALS
WEIGHT: 167 LBS | BODY MASS INDEX: 28.51 KG/M2 | HEART RATE: 79 BPM | DIASTOLIC BLOOD PRESSURE: 60 MMHG | SYSTOLIC BLOOD PRESSURE: 120 MMHG | HEIGHT: 64 IN | OXYGEN SATURATION: 97 %

## 2025-08-05 DIAGNOSIS — I87.2 CHRONIC VENOUS INSUFFICIENCY: Primary | ICD-10-CM

## 2025-08-05 DIAGNOSIS — M79.89 LEG SWELLING: ICD-10-CM

## 2025-08-05 DIAGNOSIS — R25.2 NIGHT CRAMPS: ICD-10-CM

## 2025-08-05 PROCEDURE — 99212 OFFICE O/P EST SF 10 MIN: CPT

## 2025-08-05 PROCEDURE — 3008F BODY MASS INDEX DOCD: CPT | Performed by: INTERNAL MEDICINE

## 2025-08-05 PROCEDURE — 99202 OFFICE O/P NEW SF 15 MIN: CPT

## 2025-08-05 PROCEDURE — 99204 OFFICE O/P NEW MOD 45 MIN: CPT | Performed by: INTERNAL MEDICINE

## 2025-08-05 NOTE — PROGRESS NOTES
"Chief complaint: CVI     Subjective   Patient is accompanied by her , here to establish care  She has known longstanding history of CVI, last year she had bulging painful varicose veins, leg swelling and severe leg night cramps.  Was [facility] at [facility] and underwent RFA x 4 and sclerotherapy's bilateral.  Bulging painful varicose veins significantly improved, but she continued to have leg swelling and severe leg night cramps.  Was advised more intervention, decided to come today for a second opinion  No history of SVT or DVT  No bleeding varicosities, bruises noted on the legs but she does have bruises on the arms as well  No eczema or itching     She uses compression stockings only when traveling  She does volunteer at a park, walks a lot but does stand a lot as well  She elevates her legs whenever possible, but not above the level of the heart  Body mass index is 28.67 kg/m².    Review of Systems  As noted above   Bilateral intermittent leg swelling, worse at the end of the day  Night cramps, mainly at the left calf associated with contractures  Upper and lower extremity bruises, more on the legs  Low back pain   Arthritis   GERD   No other complaints today     Medical History[1]  Surgical History[2]  Social History[3]   Family History[4]   RX Allergies[5]    Objective   Physical Exam  /60 (BP Location: Left arm, Patient Position: Sitting)   Pulse 79   Ht 1.626 m (5' 4\")   Wt 75.8 kg (167 lb)   BMI 28.67 kg/m²      General: In no acute distress  Neuro: alert and oriented x3  CV:  RRR  Lungs: CTA bilaterally  Abd:  Soft, non-tender   Psych:  Appropriate affect  Upper extremities: No swelling, +2 radial   Lower extremities: david trace edema.  +2 DP  Skin:  mild bruises. No open ulcers.  David Spider/reticular veins.  Mild chronic venous changes, especially above the medial malleoli on the left     Medications   Current Outpatient Medications   Medication Instructions    aspirin 81 mg tablet 1 " "tablet, Daily    atorvastatin (LIPITOR) 40 mg, oral, Daily    co-enzyme Q-10 100 mg, Daily    estradiol (ESTRACE) 2 g, vaginal, Daily, Apply to vagina nightly for 1 week then every Monday/Wednesday/Friday.    thyroid (pork) (ARMOUR THYROID) 60 mg, oral, Daily       Lab Review   Recent Labs     04/21/25  0809 05/31/24  0903 06/14/23  0902 05/24/22  0912 04/19/21  0819 04/22/20  0926 03/21/19  1109 07/05/18  0811    137 139 137 141 138 138 141   K 4.4 5.0 4.4 4.3 4.1 4.8 4.6 4.6    102 102 102 105 102 101 104   CO2 26 28 30 28 30 29 28 29   ANIONGAP 11 12 11 11 10 12 14 13   BUN 9 12 10 10 9 10 13 13   CREATININE 0.56 0.55 0.53 0.58 0.54 0.59 0.61 0.57   EGFR 103 >90  --   --   --   --   --   --      Recent Labs     04/21/25  0809 05/31/24  0903 06/14/23  0902 05/24/22  0912 04/19/21  0819   ALBUMIN 4.4 4.7 4.5 4.6 4.2   ALKPHOS 121 93 99 96 80   ALT 20 15 13 14 11   AST 20 14 14 16 13   BILITOT 0.8 0.4 0.7 0.7 0.5     Recent Labs     05/31/24  0903 06/14/23  0902 05/24/22  0912 04/19/21  0819   WBC 7.5 6.7 7.0 6.5   HGB 14.0 13.6 13.8 13.9   HCT 43.3 43.1 42.6 43.3    312 304 273   MCV 95 95 96 98     No results for input(s): \"PTT\", \"INR\", \"HAUF\", \"DDIMERVTE\", \"HAPTOGLOBIN\", \"FIBRINOGEN\" in the last [id number] hours.  PTT - No results in last year.    Recent Labs     04/21/25  0809 05/31/24  0903 06/14/23  0902 05/24/22  0912 04/19/21  0819   CHOL 233* 319* 308* 312* 309*   LDLF  --   --  221* 230* 227*   HDL 70 68.9 65.8 66.4 62.3   TRIG 104 118 107 80 97     No results found for: \"HGBA1C\"  Lab Results   Component Value Date    TSH 2.81 04/21/2025     Hb, PLT and kidney function reviewed     Imaging    Assessment/Plan   Alicia Pompa is a 65 y.o. female with PMHx of HLD, elevated CAC, hypothyroidism, DCIS, ovarian cyst, pulmonary nodule, and HH     _ CVI s/p mamie-RFA x 4 and sclerotherapies 2024   _ Other possible contributing risk factors  Lack of compression.  Inadequate exercise and elevation. " weight    Plan   -- No clear indication of further venous procedures given that she has not tried conservative measures and no complications as of today  -- Use 20-30 mmHg compression stockings during the day   -- Leg elevation, exercise and conservative measures discussed   -- Monitor for possible complications as VV bleeding, dermatitis, ulcers and thrombophlebitis    -- She will let me know in 8 weeks if no improvement, earlier if there is any complications, will proceed with a VI study for possible need of intervention  -- The rest as detailed in the patient's instructions    Bibi Michael MD         [1]   Past Medical History:  Diagnosis Date    Allergic contact dermatitis due to plants, except food     Contact dermatitis due to poison ivy    Breast cancer     Ovarian cyst     Ovarian cyst    Personal history of other endocrine, nutritional and metabolic disease     History of hyperlipidemia    Personal history of other endocrine, nutritional and metabolic disease     History of hypothyroidism    Tachycardia, unspecified     Tachycardia   [2]   Past Surgical History:  Procedure Laterality Date    BREAST LUMPECTOMY Left     CARDIAC CATHETERIZATION  2015    OTHER SURGICAL HISTORY  01/11/2014    Pap smear for cervical cancer screening    VEIN SURGERY  2024 RFA Upper, Lower both legs, Varithena inj,both lower   [3]   Social History  Socioeconomic History    Marital status:    Tobacco Use    Smoking status: Never     Passive exposure: Never    Smokeless tobacco: Never   Vaping Use    Vaping status: Never Used   Substance and Sexual Activity    Alcohol use: Not Currently     Alcohol/week: 0.0 standard drinks of alcohol     Comment: special occasion    Drug use: Never    Sexual activity: Not Currently     Birth control/protection: None   [4]   Family History  Problem Relation Name Age of Onset    Ovarian cancer Mother  62    Heart disease Father      Ovarian cancer Maternal Grandmother  50    Ovarian  cancer Niece  30    Cancer Mother Keri Guardado 60 - 69    Thyroid disease Mother Keri Guardado 40 - 49    Cancer Father Danny Guardado     Hypertension Father Danny Guardado     Heart attack Father Danny Guardado 40 - 49    Mental illness Father Danny Guardado     Diabetes type II Father Danny Guardado 50 - 59    Atrial fibrillation Father Danny Guardado     Atrial fibrillation Sister Ayala Guardado     Heart attack Brother Walter Guardado 50 - 59    Heart disease Brother Walter Guardado 50 - 59    Diabetes type II Brother Walter Guardado 50 - 59    Atrial fibrillation Brother Walter Guardado     Colon cancer Mother's Brother Monica     Colon cancer Mother's Sister NEVILLE LivingstonAlysha     Diabetes type II Brother Kirby 50 - 59   [5]   Allergies  Allergen Reactions    Eye Drops Relief Itching     Unknown name eye drop causes redness and burning

## 2025-08-05 NOTE — PATIENT INSTRUCTIONS
-- Use compression stocking during the day, do not use the same pair for more than 4-6 months   -- Meticulous skin care:              Wash daily with mild soap and warm water              Daily use of emollient moisturizer (ex. Sheryl, Aquaphor, Eucerin)  -- Legs should be elevated whenever you are sleeping/lying in bed or sitting with 1-2 pillows below the legs in order to try to keep them above the level of heart  -- Exercise encouraged (at least 30 mins, 5 times a week, cyclic dependent thrusting of legs recommended to activate calf muscle pump)  -- Please try to follow a low salt diet  -- Weight loss  encouraged  -- Close monitoring for signs of infection/wounds     -- Follow up in 3-4 months, earlier if needed

## 2025-08-08 ENCOUNTER — APPOINTMENT (OUTPATIENT)
Dept: CARDIOLOGY | Facility: CLINIC | Age: 65
End: 2025-08-08
Payer: COMMERCIAL

## 2025-08-21 ENCOUNTER — HOSPITAL ENCOUNTER (OUTPATIENT)
Dept: RADIOLOGY | Facility: CLINIC | Age: 65
Discharge: HOME | End: 2025-08-21
Payer: COMMERCIAL

## 2025-08-21 VITALS — HEIGHT: 64 IN | BODY MASS INDEX: 28.53 KG/M2 | WEIGHT: 167.11 LBS

## 2025-08-21 DIAGNOSIS — Z12.31 BREAST CANCER SCREENING BY MAMMOGRAM: ICD-10-CM

## 2025-08-21 LAB
ALBUMIN SERPL-MCNC: 4.4 G/DL (ref 3.6–5.1)
ALP SERPL-CCNC: 113 U/L (ref 37–153)
ALT SERPL-CCNC: 17 U/L (ref 6–29)
ANION GAP SERPL CALCULATED.4IONS-SCNC: 8 MMOL/L (CALC) (ref 7–17)
AST SERPL-CCNC: 14 U/L (ref 10–35)
BASOPHILS # BLD AUTO: 41 CELLS/UL (ref 0–200)
BASOPHILS NFR BLD AUTO: 0.5 %
BILIRUB SERPL-MCNC: 0.8 MG/DL (ref 0.2–1.2)
BUN SERPL-MCNC: 8 MG/DL (ref 7–25)
CALCIUM SERPL-MCNC: 9.4 MG/DL (ref 8.6–10.4)
CHLORIDE SERPL-SCNC: 103 MMOL/L (ref 98–110)
CHOLEST SERPL-MCNC: 172 MG/DL
CHOLEST/HDLC SERPL: 2.8 (CALC)
CO2 SERPL-SCNC: 28 MMOL/L (ref 20–32)
CREAT SERPL-MCNC: 0.48 MG/DL (ref 0.5–1.05)
EGFRCR SERPLBLD CKD-EPI 2021: 106 ML/MIN/1.73M2
EOSINOPHIL # BLD AUTO: 230 CELLS/UL (ref 15–500)
EOSINOPHIL NFR BLD AUTO: 2.8 %
ERYTHROCYTE [DISTWIDTH] IN BLOOD BY AUTOMATED COUNT: 12.6 % (ref 11–15)
GLUCOSE SERPL-MCNC: 106 MG/DL (ref 65–99)
HCT VFR BLD AUTO: 41.9 % (ref 35–45)
HDLC SERPL-MCNC: 62 MG/DL
HGB BLD-MCNC: 13.6 G/DL (ref 11.7–15.5)
LDLC SERPL CALC-MCNC: 92 MG/DL (CALC)
LYMPHOCYTES # BLD AUTO: 1829 CELLS/UL (ref 850–3900)
LYMPHOCYTES NFR BLD AUTO: 22.3 %
MCH RBC QN AUTO: 31.1 PG (ref 27–33)
MCHC RBC AUTO-ENTMCNC: 32.5 G/DL (ref 32–36)
MCV RBC AUTO: 95.7 FL (ref 80–100)
MONOCYTES # BLD AUTO: 631 CELLS/UL (ref 200–950)
MONOCYTES NFR BLD AUTO: 7.7 %
NEUTROPHILS # BLD AUTO: 5469 CELLS/UL (ref 1500–7800)
NEUTROPHILS NFR BLD AUTO: 66.7 %
NONHDLC SERPL-MCNC: 110 MG/DL (CALC)
PLATELET # BLD AUTO: 303 THOUSAND/UL (ref 140–400)
PMV BLD REES-ECKER: 10.8 FL (ref 7.5–12.5)
POTASSIUM SERPL-SCNC: 4.7 MMOL/L (ref 3.5–5.3)
PROT SERPL-MCNC: 6.8 G/DL (ref 6.1–8.1)
RBC # BLD AUTO: 4.38 MILLION/UL (ref 3.8–5.1)
SODIUM SERPL-SCNC: 139 MMOL/L (ref 135–146)
TRIGL SERPL-MCNC: 87 MG/DL
WBC # BLD AUTO: 8.2 THOUSAND/UL (ref 3.8–10.8)

## 2025-08-21 PROCEDURE — 77063 BREAST TOMOSYNTHESIS BI: CPT | Performed by: RADIOLOGY

## 2025-08-21 PROCEDURE — 77067 SCR MAMMO BI INCL CAD: CPT | Performed by: RADIOLOGY

## 2025-08-21 PROCEDURE — 77063 BREAST TOMOSYNTHESIS BI: CPT
